# Patient Record
Sex: MALE | Race: WHITE | NOT HISPANIC OR LATINO | Employment: FULL TIME | URBAN - METROPOLITAN AREA
[De-identification: names, ages, dates, MRNs, and addresses within clinical notes are randomized per-mention and may not be internally consistent; named-entity substitution may affect disease eponyms.]

---

## 2017-07-06 ENCOUNTER — HOSPITAL ENCOUNTER (OUTPATIENT)
Dept: RADIOLOGY | Facility: MEDICAL CENTER | Age: 42
End: 2017-07-06
Attending: NEUROLOGICAL SURGERY
Payer: COMMERCIAL

## 2017-07-06 ENCOUNTER — HOSPITAL ENCOUNTER (OUTPATIENT)
Dept: RADIOLOGY | Facility: MEDICAL CENTER | Age: 42
End: 2017-07-06

## 2017-07-06 DIAGNOSIS — D49.6 BRAIN NEOPLASM (HCC): ICD-10-CM

## 2017-07-06 PROCEDURE — A9579 GAD-BASE MR CONTRAST NOS,1ML: HCPCS | Performed by: NEUROLOGICAL SURGERY

## 2017-07-06 PROCEDURE — 70553 MRI BRAIN STEM W/O & W/DYE: CPT

## 2017-07-06 PROCEDURE — 700117 HCHG RX CONTRAST REV CODE 255: Performed by: NEUROLOGICAL SURGERY

## 2017-07-06 RX ADMIN — GADODIAMIDE 20 ML: 287 INJECTION INTRAVENOUS at 09:05

## 2017-09-16 ENCOUNTER — HOSPITAL ENCOUNTER (OUTPATIENT)
Dept: LAB | Facility: MEDICAL CENTER | Age: 42
End: 2017-09-16
Payer: COMMERCIAL

## 2017-09-16 LAB
BDY FAT % MEASURED: 24.9 %
BP DIAS: 73 MMHG
BP SYS: 125 MMHG
CHOLEST SERPL-MCNC: 221 MG/DL (ref 100–199)
DIABETES HTDIA: NO
EVENT NAME HTEVT: NORMAL
FASTING HTFAS: YES
GLUCOSE SERPL-MCNC: 94 MG/DL (ref 65–99)
HDLC SERPL-MCNC: 49 MG/DL
HYPERTENSION HTHYP: NO
LDLC SERPL CALC-MCNC: 154 MG/DL
SCREENING LOC CITY HTCIT: NORMAL
SCREENING LOC STATE HTSTA: NORMAL
SCREENING LOCATION HTLOC: NORMAL
SMOKING HTSMO: NO
SUBSCRIBER ID HTSID: NORMAL
TRIGL SERPL-MCNC: 90 MG/DL (ref 0–149)

## 2017-09-16 PROCEDURE — 36415 COLL VENOUS BLD VENIPUNCTURE: CPT

## 2017-09-16 PROCEDURE — S5190 WELLNESS ASSESSMENT BY NONPH: HCPCS

## 2017-09-16 PROCEDURE — 82947 ASSAY GLUCOSE BLOOD QUANT: CPT

## 2017-09-16 PROCEDURE — 80061 LIPID PANEL: CPT

## 2017-09-26 ENCOUNTER — APPOINTMENT (OUTPATIENT)
Dept: SOCIAL WORK | Facility: CLINIC | Age: 42
End: 2017-09-26
Payer: COMMERCIAL

## 2017-09-26 PROCEDURE — 90471 IMMUNIZATION ADMIN: CPT | Performed by: REGISTERED NURSE

## 2017-09-26 PROCEDURE — 90686 IIV4 VACC NO PRSV 0.5 ML IM: CPT | Performed by: REGISTERED NURSE

## 2018-11-29 ENCOUNTER — APPOINTMENT (OUTPATIENT)
Dept: RADIOLOGY | Facility: MEDICAL CENTER | Age: 43
End: 2018-11-29
Attending: EMERGENCY MEDICINE
Payer: COMMERCIAL

## 2018-11-29 ENCOUNTER — HOSPITAL ENCOUNTER (OUTPATIENT)
Facility: MEDICAL CENTER | Age: 43
End: 2018-11-30
Attending: EMERGENCY MEDICINE | Admitting: SURGERY
Payer: COMMERCIAL

## 2018-11-29 DIAGNOSIS — K35.30 ACUTE APPENDICITIS WITH LOCALIZED PERITONITIS, WITHOUT PERFORATION, ABSCESS, OR GANGRENE: ICD-10-CM

## 2018-11-29 DIAGNOSIS — K35.80 ACUTE APPENDICITIS, UNSPECIFIED ACUTE APPENDICITIS TYPE: ICD-10-CM

## 2018-11-29 DIAGNOSIS — G89.18 ACUTE POST-OPERATIVE PAIN: ICD-10-CM

## 2018-11-29 LAB
ALBUMIN SERPL BCP-MCNC: 4.5 G/DL (ref 3.2–4.9)
ALBUMIN/GLOB SERPL: 1.6 G/DL
ALP SERPL-CCNC: 60 U/L (ref 30–99)
ALT SERPL-CCNC: 61 U/L (ref 2–50)
ANION GAP SERPL CALC-SCNC: 11 MMOL/L (ref 0–11.9)
APPEARANCE UR: CLEAR
AST SERPL-CCNC: 36 U/L (ref 12–45)
BASOPHILS # BLD AUTO: 0.3 % (ref 0–1.8)
BASOPHILS # BLD: 0.05 K/UL (ref 0–0.12)
BILIRUB SERPL-MCNC: 0.9 MG/DL (ref 0.1–1.5)
BILIRUB UR QL STRIP.AUTO: NEGATIVE
BUN SERPL-MCNC: 9 MG/DL (ref 8–22)
CALCIUM SERPL-MCNC: 9.5 MG/DL (ref 8.4–10.2)
CHLORIDE SERPL-SCNC: 99 MMOL/L (ref 96–112)
CO2 SERPL-SCNC: 24 MMOL/L (ref 20–33)
COLOR UR: YELLOW
CREAT SERPL-MCNC: 0.94 MG/DL (ref 0.5–1.4)
EOSINOPHIL # BLD AUTO: 0.03 K/UL (ref 0–0.51)
EOSINOPHIL NFR BLD: 0.2 % (ref 0–6.9)
ERYTHROCYTE [DISTWIDTH] IN BLOOD BY AUTOMATED COUNT: 37.2 FL (ref 35.9–50)
GLOBULIN SER CALC-MCNC: 2.8 G/DL (ref 1.9–3.5)
GLUCOSE SERPL-MCNC: 114 MG/DL (ref 65–99)
GLUCOSE UR STRIP.AUTO-MCNC: NEGATIVE MG/DL
HCT VFR BLD AUTO: 45.7 % (ref 42–52)
HGB BLD-MCNC: 16 G/DL (ref 14–18)
IMM GRANULOCYTES # BLD AUTO: 0.05 K/UL (ref 0–0.11)
IMM GRANULOCYTES NFR BLD AUTO: 0.3 % (ref 0–0.9)
KETONES UR STRIP.AUTO-MCNC: NEGATIVE MG/DL
LEUKOCYTE ESTERASE UR QL STRIP.AUTO: NEGATIVE
LIPASE SERPL-CCNC: 21 U/L (ref 7–58)
LYMPHOCYTES # BLD AUTO: 1.5 K/UL (ref 1–4.8)
LYMPHOCYTES NFR BLD: 9.5 % (ref 22–41)
MCH RBC QN AUTO: 31.5 PG (ref 27–33)
MCHC RBC AUTO-ENTMCNC: 35 G/DL (ref 33.7–35.3)
MCV RBC AUTO: 90 FL (ref 81.4–97.8)
MICRO URNS: NORMAL
MONOCYTES # BLD AUTO: 1.02 K/UL (ref 0–0.85)
MONOCYTES NFR BLD AUTO: 6.4 % (ref 0–13.4)
NEUTROPHILS # BLD AUTO: 13.21 K/UL (ref 1.82–7.42)
NEUTROPHILS NFR BLD: 83.3 % (ref 44–72)
NITRITE UR QL STRIP.AUTO: NEGATIVE
NRBC # BLD AUTO: 0 K/UL
NRBC BLD-RTO: 0 /100 WBC
PH UR STRIP.AUTO: 6 [PH]
PLATELET # BLD AUTO: 219 K/UL (ref 164–446)
PMV BLD AUTO: 9.1 FL (ref 9–12.9)
POTASSIUM SERPL-SCNC: 3.6 MMOL/L (ref 3.6–5.5)
PROT SERPL-MCNC: 7.3 G/DL (ref 6–8.2)
PROT UR QL STRIP: NEGATIVE MG/DL
RBC # BLD AUTO: 5.08 M/UL (ref 4.7–6.1)
RBC UR QL AUTO: NEGATIVE
SODIUM SERPL-SCNC: 134 MMOL/L (ref 135–145)
SP GR UR STRIP.AUTO: <=1.005
WBC # BLD AUTO: 15.9 K/UL (ref 4.8–10.8)

## 2018-11-29 PROCEDURE — 87040 BLOOD CULTURE FOR BACTERIA: CPT

## 2018-11-29 PROCEDURE — 80053 COMPREHEN METABOLIC PANEL: CPT

## 2018-11-29 PROCEDURE — 81003 URINALYSIS AUTO W/O SCOPE: CPT

## 2018-11-29 PROCEDURE — 74177 CT ABD & PELVIS W/CONTRAST: CPT

## 2018-11-29 PROCEDURE — 700105 HCHG RX REV CODE 258: Performed by: EMERGENCY MEDICINE

## 2018-11-29 PROCEDURE — 700117 HCHG RX CONTRAST REV CODE 255: Performed by: EMERGENCY MEDICINE

## 2018-11-29 PROCEDURE — 85025 COMPLETE CBC W/AUTO DIFF WBC: CPT

## 2018-11-29 PROCEDURE — 83690 ASSAY OF LIPASE: CPT

## 2018-11-29 PROCEDURE — 99285 EMERGENCY DEPT VISIT HI MDM: CPT

## 2018-11-29 RX ORDER — SODIUM CHLORIDE 9 MG/ML
1000 INJECTION, SOLUTION INTRAVENOUS ONCE
Status: COMPLETED | OUTPATIENT
Start: 2018-11-29 | End: 2018-11-30

## 2018-11-29 RX ADMIN — IOHEXOL 100 ML: 350 INJECTION, SOLUTION INTRAVENOUS at 23:18

## 2018-11-29 RX ADMIN — SODIUM CHLORIDE 1000 ML: 9 INJECTION, SOLUTION INTRAVENOUS at 22:48

## 2018-11-30 VITALS
RESPIRATION RATE: 16 BRPM | OXYGEN SATURATION: 92 % | TEMPERATURE: 98.2 F | HEART RATE: 88 BPM | BODY MASS INDEX: 27.23 KG/M2 | WEIGHT: 179.68 LBS | DIASTOLIC BLOOD PRESSURE: 77 MMHG | HEIGHT: 68 IN | SYSTOLIC BLOOD PRESSURE: 120 MMHG

## 2018-11-30 PROBLEM — K35.30 ACUTE APPENDICITIS WITH LOCALIZED PERITONITIS: Status: ACTIVE | Noted: 2018-11-30

## 2018-11-30 LAB — PATHOLOGY CONSULT NOTE: NORMAL

## 2018-11-30 PROCEDURE — G0378 HOSPITAL OBSERVATION PER HR: HCPCS

## 2018-11-30 PROCEDURE — 700101 HCHG RX REV CODE 250: Performed by: SURGERY

## 2018-11-30 PROCEDURE — 700101 HCHG RX REV CODE 250: Performed by: EMERGENCY MEDICINE

## 2018-11-30 PROCEDURE — 160048 HCHG OR STATISTICAL LEVEL 1-5: Performed by: SURGERY

## 2018-11-30 PROCEDURE — 88304 TISSUE EXAM BY PATHOLOGIST: CPT

## 2018-11-30 PROCEDURE — 501577 HCHG TROCAR, STEP 11MM: Performed by: SURGERY

## 2018-11-30 PROCEDURE — 501576 HCHG TROCAR, SMTH CAN&SEAL12: Performed by: SURGERY

## 2018-11-30 PROCEDURE — 700105 HCHG RX REV CODE 258: Performed by: SURGERY

## 2018-11-30 PROCEDURE — 160009 HCHG ANES TIME/MIN: Performed by: SURGERY

## 2018-11-30 PROCEDURE — 160046 HCHG PACU - 1ST 60 MINS PHASE II: Performed by: SURGERY

## 2018-11-30 PROCEDURE — 501583 HCHG TROCAR, THRD CAN&SEAL 5X100: Performed by: SURGERY

## 2018-11-30 PROCEDURE — 96367 TX/PROPH/DG ADDL SEQ IV INF: CPT

## 2018-11-30 PROCEDURE — 160028 HCHG SURGERY MINUTES - 1ST 30 MINS LEVEL 3: Performed by: SURGERY

## 2018-11-30 PROCEDURE — 700111 HCHG RX REV CODE 636 W/ 250 OVERRIDE (IP): Performed by: EMERGENCY MEDICINE

## 2018-11-30 PROCEDURE — 700111 HCHG RX REV CODE 636 W/ 250 OVERRIDE (IP): Performed by: SURGERY

## 2018-11-30 PROCEDURE — 501571 HCHG TROCAR, SEPARATOR 12X100: Performed by: SURGERY

## 2018-11-30 PROCEDURE — A6402 STERILE GAUZE <= 16 SQ IN: HCPCS | Performed by: SURGERY

## 2018-11-30 PROCEDURE — 500002 HCHG ADHESIVE, DERMABOND: Performed by: SURGERY

## 2018-11-30 PROCEDURE — 96361 HYDRATE IV INFUSION ADD-ON: CPT

## 2018-11-30 PROCEDURE — 700105 HCHG RX REV CODE 258: Performed by: EMERGENCY MEDICINE

## 2018-11-30 PROCEDURE — 501399 HCHG SPECIMAN BAG, ENDO CATC: Performed by: SURGERY

## 2018-11-30 PROCEDURE — 700111 HCHG RX REV CODE 636 W/ 250 OVERRIDE (IP)

## 2018-11-30 PROCEDURE — 160025 RECOVERY II MINUTES (STATS): Performed by: SURGERY

## 2018-11-30 PROCEDURE — 90471 IMMUNIZATION ADMIN: CPT

## 2018-11-30 PROCEDURE — 700101 HCHG RX REV CODE 250

## 2018-11-30 PROCEDURE — 160039 HCHG SURGERY MINUTES - EA ADDL 1 MIN LEVEL 3: Performed by: SURGERY

## 2018-11-30 PROCEDURE — 90686 IIV4 VACC NO PRSV 0.5 ML IM: CPT | Performed by: SURGERY

## 2018-11-30 PROCEDURE — 160035 HCHG PACU - 1ST 60 MINS PHASE I: Performed by: SURGERY

## 2018-11-30 PROCEDURE — 96365 THER/PROPH/DIAG IV INF INIT: CPT

## 2018-11-30 PROCEDURE — 160002 HCHG RECOVERY MINUTES (STAT): Performed by: SURGERY

## 2018-11-30 PROCEDURE — 502571 HCHG PACK, LAP CHOLE: Performed by: SURGERY

## 2018-11-30 PROCEDURE — 500800 HCHG LAPAROSCOPIC J/L HOOK: Performed by: SURGERY

## 2018-11-30 PROCEDURE — 501838 HCHG SUTURE GENERAL: Performed by: SURGERY

## 2018-11-30 PROCEDURE — 501570 HCHG TROCAR, SEPARATOR: Performed by: SURGERY

## 2018-11-30 PROCEDURE — 501450 HCHG STAPLES, ENDO MULTIFIRE: Performed by: SURGERY

## 2018-11-30 RX ORDER — SODIUM CHLORIDE, SODIUM LACTATE, POTASSIUM CHLORIDE, CALCIUM CHLORIDE 600; 310; 30; 20 MG/100ML; MG/100ML; MG/100ML; MG/100ML
INJECTION, SOLUTION INTRAVENOUS CONTINUOUS
Status: DISCONTINUED | OUTPATIENT
Start: 2018-11-30 | End: 2018-11-30 | Stop reason: HOSPADM

## 2018-11-30 RX ORDER — CETIRIZINE HYDROCHLORIDE 10 MG/1
10 TABLET ORAL DAILY
COMMUNITY

## 2018-11-30 RX ORDER — AMOXICILLIN AND CLAVULANATE POTASSIUM 875; 125 MG/1; MG/1
1 TABLET, FILM COATED ORAL 2 TIMES DAILY
Qty: 10 TAB | Refills: 0 | Status: SHIPPED | OUTPATIENT
Start: 2018-11-30

## 2018-11-30 RX ORDER — MORPHINE SULFATE 4 MG/ML
4 INJECTION, SOLUTION INTRAMUSCULAR; INTRAVENOUS EVERY 4 HOURS PRN
Status: DISCONTINUED | OUTPATIENT
Start: 2018-11-30 | End: 2018-11-30

## 2018-11-30 RX ORDER — HYDROMORPHONE HYDROCHLORIDE 1 MG/ML
0.1 INJECTION, SOLUTION INTRAMUSCULAR; INTRAVENOUS; SUBCUTANEOUS
Status: DISCONTINUED | OUTPATIENT
Start: 2018-11-30 | End: 2018-11-30 | Stop reason: HOSPADM

## 2018-11-30 RX ORDER — OXYCODONE HCL 5 MG/5 ML
10 SOLUTION, ORAL ORAL
Status: DISCONTINUED | OUTPATIENT
Start: 2018-11-30 | End: 2018-11-30 | Stop reason: HOSPADM

## 2018-11-30 RX ORDER — ONDANSETRON 2 MG/ML
4 INJECTION INTRAMUSCULAR; INTRAVENOUS EVERY 8 HOURS PRN
Status: DISCONTINUED | OUTPATIENT
Start: 2018-11-30 | End: 2018-11-30

## 2018-11-30 RX ORDER — BUPIVACAINE HYDROCHLORIDE AND EPINEPHRINE 2.5; 5 MG/ML; UG/ML
INJECTION, SOLUTION EPIDURAL; INFILTRATION; INTRACAUDAL; PERINEURAL
Status: DISCONTINUED | OUTPATIENT
Start: 2018-11-30 | End: 2018-11-30 | Stop reason: HOSPADM

## 2018-11-30 RX ORDER — DIPHENHYDRAMINE HYDROCHLORIDE 50 MG/ML
12.5 INJECTION INTRAMUSCULAR; INTRAVENOUS
Status: DISCONTINUED | OUTPATIENT
Start: 2018-11-30 | End: 2018-11-30 | Stop reason: HOSPADM

## 2018-11-30 RX ORDER — HYDROMORPHONE HYDROCHLORIDE 1 MG/ML
0.2 INJECTION, SOLUTION INTRAMUSCULAR; INTRAVENOUS; SUBCUTANEOUS
Status: DISCONTINUED | OUTPATIENT
Start: 2018-11-30 | End: 2018-11-30 | Stop reason: HOSPADM

## 2018-11-30 RX ORDER — OXYCODONE HYDROCHLORIDE 10 MG/1
10 TABLET ORAL
Status: DISCONTINUED | OUTPATIENT
Start: 2018-11-30 | End: 2018-11-30 | Stop reason: HOSPADM

## 2018-11-30 RX ORDER — OXYCODONE HYDROCHLORIDE 5 MG/1
5 TABLET ORAL EVERY 4 HOURS PRN
Qty: 18 TAB | Refills: 0 | Status: SHIPPED | OUTPATIENT
Start: 2018-11-30 | End: 2018-12-03

## 2018-11-30 RX ORDER — ONDANSETRON 2 MG/ML
4 INJECTION INTRAMUSCULAR; INTRAVENOUS EVERY 4 HOURS PRN
Status: DISCONTINUED | OUTPATIENT
Start: 2018-11-30 | End: 2018-11-30 | Stop reason: HOSPADM

## 2018-11-30 RX ORDER — OXYCODONE HCL 5 MG/5 ML
5 SOLUTION, ORAL ORAL
Status: DISCONTINUED | OUTPATIENT
Start: 2018-11-30 | End: 2018-11-30 | Stop reason: HOSPADM

## 2018-11-30 RX ORDER — SODIUM CHLORIDE, SODIUM LACTATE, POTASSIUM CHLORIDE, CALCIUM CHLORIDE 600; 310; 30; 20 MG/100ML; MG/100ML; MG/100ML; MG/100ML
INJECTION, SOLUTION INTRAVENOUS ONCE
Status: DISCONTINUED | OUTPATIENT
Start: 2018-11-30 | End: 2018-11-30 | Stop reason: HOSPADM

## 2018-11-30 RX ORDER — BISMUTH SUBSALICYLATE 262 MG/1
262 TABLET, CHEWABLE ORAL
COMMUNITY

## 2018-11-30 RX ORDER — MEPERIDINE HYDROCHLORIDE 25 MG/ML
6.25 INJECTION INTRAMUSCULAR; INTRAVENOUS; SUBCUTANEOUS
Status: DISCONTINUED | OUTPATIENT
Start: 2018-11-30 | End: 2018-11-30 | Stop reason: HOSPADM

## 2018-11-30 RX ORDER — ONDANSETRON 2 MG/ML
4 INJECTION INTRAMUSCULAR; INTRAVENOUS
Status: DISCONTINUED | OUTPATIENT
Start: 2018-11-30 | End: 2018-11-30 | Stop reason: HOSPADM

## 2018-11-30 RX ORDER — ONDANSETRON 4 MG/1
4 TABLET, FILM COATED ORAL EVERY 4 HOURS PRN
Qty: 18 TAB | Refills: 1 | Status: SHIPPED | OUTPATIENT
Start: 2018-11-30 | End: 2018-12-03

## 2018-11-30 RX ORDER — OXYCODONE HYDROCHLORIDE 5 MG/1
5 TABLET ORAL
Status: DISCONTINUED | OUTPATIENT
Start: 2018-11-30 | End: 2018-11-30 | Stop reason: HOSPADM

## 2018-11-30 RX ORDER — MORPHINE SULFATE 4 MG/ML
2-4 INJECTION, SOLUTION INTRAMUSCULAR; INTRAVENOUS
Status: DISCONTINUED | OUTPATIENT
Start: 2018-11-30 | End: 2018-11-30 | Stop reason: HOSPADM

## 2018-11-30 RX ORDER — SODIUM CHLORIDE, SODIUM LACTATE, POTASSIUM CHLORIDE, CALCIUM CHLORIDE 600; 310; 30; 20 MG/100ML; MG/100ML; MG/100ML; MG/100ML
INJECTION, SOLUTION INTRAVENOUS ONCE
Status: COMPLETED | OUTPATIENT
Start: 2018-11-30 | End: 2018-11-30

## 2018-11-30 RX ORDER — HYDROMORPHONE HYDROCHLORIDE 1 MG/ML
0.4 INJECTION, SOLUTION INTRAMUSCULAR; INTRAVENOUS; SUBCUTANEOUS
Status: DISCONTINUED | OUTPATIENT
Start: 2018-11-30 | End: 2018-11-30 | Stop reason: HOSPADM

## 2018-11-30 RX ORDER — SODIUM CHLORIDE 9 MG/ML
INJECTION, SOLUTION INTRAVENOUS CONTINUOUS
Status: DISCONTINUED | OUTPATIENT
Start: 2018-11-30 | End: 2018-11-30 | Stop reason: HOSPADM

## 2018-11-30 RX ORDER — HALOPERIDOL 5 MG/ML
1 INJECTION INTRAMUSCULAR
Status: DISCONTINUED | OUTPATIENT
Start: 2018-11-30 | End: 2018-11-30 | Stop reason: HOSPADM

## 2018-11-30 RX ADMIN — CEFTRIAXONE SODIUM 2 G: 2 INJECTION, POWDER, FOR SOLUTION INTRAMUSCULAR; INTRAVENOUS at 00:11

## 2018-11-30 RX ADMIN — METRONIDAZOLE 500 MG: 500 INJECTION, SOLUTION INTRAVENOUS at 00:42

## 2018-11-30 RX ADMIN — CEFOTETAN DISODIUM 2 G: 2 INJECTION, POWDER, FOR SOLUTION INTRAMUSCULAR; INTRAVENOUS at 07:44

## 2018-11-30 RX ADMIN — SODIUM CHLORIDE: 9 INJECTION, SOLUTION INTRAVENOUS at 01:39

## 2018-11-30 RX ADMIN — INFLUENZA A VIRUS A/MICHIGAN/45/2015 X-275 (H1N1) ANTIGEN (FORMALDEHYDE INACTIVATED), INFLUENZA A VIRUS A/SINGAPORE/INFIMH-16-0019/2016 IVR-186 (H3N2) ANTIGEN (FORMALDEHYDE INACTIVATED), INFLUENZA B VIRUS B/PHUKET/3073/2013 ANTIGEN (FORMALDEHYDE INACTIVATED), AND INFLUENZA B VIRUS B/MARYLAND/15/2016 BX-69A ANTIGEN (FORMALDEHYDE INACTIVATED) 0.5 ML: 15; 15; 15; 15 INJECTION, SUSPENSION INTRAMUSCULAR at 05:25

## 2018-11-30 RX ADMIN — SODIUM CHLORIDE, SODIUM LACTATE, POTASSIUM CHLORIDE, CALCIUM CHLORIDE: 600; 310; 30; 20 INJECTION, SOLUTION INTRAVENOUS at 10:57

## 2018-11-30 ASSESSMENT — COGNITIVE AND FUNCTIONAL STATUS - GENERAL
SUGGESTED CMS G CODE MODIFIER DAILY ACTIVITY: CH
MOBILITY SCORE: 24
DAILY ACTIVITIY SCORE: 24
SUGGESTED CMS G CODE MODIFIER MOBILITY: CH

## 2018-11-30 ASSESSMENT — PAIN SCALES - GENERAL
PAINLEVEL_OUTOF10: 0

## 2018-11-30 ASSESSMENT — LIFESTYLE VARIABLES
ALCOHOL_USE: NO
EVER_SMOKED: YES

## 2018-11-30 ASSESSMENT — PATIENT HEALTH QUESTIONNAIRE - PHQ9
2. FEELING DOWN, DEPRESSED, IRRITABLE, OR HOPELESS: NOT AT ALL
1. LITTLE INTEREST OR PLEASURE IN DOING THINGS: NOT AT ALL
SUM OF ALL RESPONSES TO PHQ9 QUESTIONS 1 AND 2: 0

## 2018-11-30 NOTE — OR NURSING
1225- Patient admitted to PACU from the operating room. No distress noted at this time. Oral airway in place. Respirations noted at 12. 3 laparascopic stab wounds to abdomen noted as clean, dry and intact.     1235- Oral airway discontinued.     1240- Patient denies any pain or nausea currently. Patient given liquids and able to tolerate without difficulty.     1250- Patient resting in bed. Incentive spirometer given and patient given correct return demonstration.     1300-Patient awake, denies any pain. Patient ready for transport to stage II once room available.     1312- Patient transferred to stage II area after assistance with dressing from nursing staff.

## 2018-11-30 NOTE — H&P
Surgery General History & Physical Note    Date  11/30/2018    Primary Care Physician  Lakshmi Ayala M.D.    CC  RLQ pain    Requesting physician: Dr. Nolan    [facility]  This is a 43 y.o. male who presented with generalized abdominal pain localizing to the right lower quadrant that started after lunch yesterday. He has had fevers and sweats, as well as anorexia and nausea. He has not vomited. He hasn't had any recent illnesses and felt well prior to this.     History reviewed. No pertinent past medical history.    History reviewed. No pertinent surgical history.    Current Facility-Administered Medications   Medication Dose Route Frequency Provider Last Rate Last Dose   • NS infusion   Intravenous Continuous Irene Nolan D.O. 125 mL/hr at 11/30/18 0139     • morphine (pf) 4 mg/ml injection 4 mg  4 mg Intravenous Q4HRS PRN MADELAINE Cohen.MAURICE.       • ondansetron (ZOFRAN) syringe/vial injection 4 mg  4 mg Intravenous Q8HRS PRN MADELAINE Cohen.O.           Social History     Social History   • Marital status:      Spouse name: N/A   • Number of children: N/A   • Years of education: N/A     Occupational History   • Not on file.     Social History Main Topics   • Smoking status: Never Smoker   • Smokeless tobacco: Never Used   • Alcohol use Yes   • Drug use: No   • Sexual activity: Not on file     Other Topics Concern   • Not on file     Social History Narrative   • No narrative on file       History reviewed. No pertinent family history.    Allergies  Patient has no known allergies.    Review of Systems  Negativeper 12 systems except as described in the HPI    Physical Exam   Constitutional: He is oriented to person, place, and time. He appears well-developed and well-nourished. No distress.   HENT:   Head: Normocephalic and atraumatic.   Eyes: Conjunctivae are normal.   Neck: Normal range of motion. Neck supple. No tracheal deviation present. No thyromegaly present.   Cardiovascular: Normal rate, regular rhythm and  intact distal pulses.    Pulmonary/Chest: Effort normal and breath sounds normal. No respiratory distress.   Abdominal: Soft. He exhibits no distension. There is tenderness. There is rebound and guarding.   Right lower quadrant rebound and guarding, +Rovsing's sign   Musculoskeletal: Normal range of motion. He exhibits no edema or deformity.   Neurological: He is alert and oriented to person, place, and time.   Skin: Skin is warm and dry. No rash noted. He is not diaphoretic. No erythema.       Vital Signs  Blood Pressure: 106/71   Temperature: 36.8 °C (98.3 °F)   Pulse: 71   Respiration: 15   Pulse Oximetry: 93 %       Labs:  Recent Labs      11/29/18   2100   WBC  15.9*   RBC  5.08   HEMOGLOBIN  16.0   HEMATOCRIT  45.7   MCV  90.0   MCH  31.5   MCHC  35.0   RDW  37.2   PLATELETCT  219   MPV  9.1     Recent Labs      11/29/18   2100   SODIUM  134*   POTASSIUM  3.6   CHLORIDE  99   CO2  24   GLUCOSE  114*   BUN  9   CREATININE  0.94   CALCIUM  9.5         Recent Labs      11/29/18   2100   ASTSGOT  36   ALTSGPT  61*   TBILIRUBIN  0.9   ALKPHOSPHAT  60   GLOBULIN  2.8       Radiology:  CT-ABDOMEN-PELVIS WITH   Final Result         1.  Findings compatible with acute appendicitis.      These findings were discussed with the patient's clinician, JUVENTINO GARRETT, on 11/29/2018 11:24 PM.        On my review of the images, the appendix is lateral to the cecum, enlarged with surrounding fat stranding but no free fluid or air to indicated perforation.     Assessment/Plan:  Acute appendicitis with localized peritonitis.   Will proceed to surgery (laparosocpic appendectomy) this morning. We discussed the surgery itself with risks including, but not limited to, bleeding, infection, damage to small or large intestine, needing an open procedure, needing a drain placement, prolonged hospital stay. We also discussed the typical post operative course. All of his questions were answered to his apparent satisfaction and his agreed to  proceed. We will proceed with surgery today.

## 2018-11-30 NOTE — ED PROVIDER NOTES
"ED Provider Note  CHIEF COMPLAINT  Abdominal pain and nausea    [facility]  Kang Lundy is a 43 y.o. male who presents to the emergency department for evaluation of abdominal pain and nausea. The patient states that he started having abdominal pain suddenly after lunch. He states that the pain is located in the RLQ. He describes th pain as cramping. It has been constant but has been waxing and waning. He admits to nausea but has not vomited. He has not had diarrhea but does admit to subjective fevers. He denies any dysuria or hematuria. He denies any testicular pain. He denies any previous abdominal surgeries. He denies any recent travel or suspicious food intake.     REVIEW OF SYSTEMS  See HPI for further details. All other systems are negative.     PAST MEDICAL HISTORY       SOCIAL HISTORY  Social History     Social History Main Topics   • Smoking status: Never Smoker   • Smokeless tobacco: Never Used   • Alcohol use Yes   • Drug use: No   • Sexual activity: Not on file       SURGICAL HISTORY  patient denies any surgical history    CURRENT MEDICATIONS  Home Medications     Reviewed by Mio Garcia R.N. (Registered Nurse) on 11/29/18 at 2056  Med List Status: Partial   Medication Last Dose Status        Patient Isaias Taking any Medications                       ALLERGIES  No Known Allergies    PHYSICAL EXAM  VITAL SIGNS: /92   Pulse 78   Temp 37.3 °C (99.2 °F) (Temporal)   Resp 18   Ht 1.727 m (5' 8\")   Wt 81.5 kg (179 lb 10.8 oz)   SpO2 93%   BMI 27.32 kg/m²    Constitutional: Alert and in no apparent distress.  HENT: Normocephalic atraumatic. Bilateral external ears normal. Nose normal. Mucous membranes are moist.  Eyes: Pupils are equal and reactive. Conjunctiva normal. Non-icteric sclera.   Neck: Normal range of motion without tenderness. Supple. No meningeal signs.  Cardiovascular: Regular rate and rhythm. No murmurs, gallops or rubs.  Thorax & Lungs: Breath sounds are clear to auscultation " bilaterally. No wheezing, rhonchi or rales.  Abdomen: Soft and non-distended.  The patient does have tenderness to palpation and involuntary guarding in the right lower quadrant.  No rebound or rigidity is noted.    Skin: Warm and dry. No rashes are noted.  Back: No bony tenderness, No CVA tenderness.   Extremities: 2+ peripheral pulses. Cap refill is less than 2 seconds. No edema, cyanosis, or clubbing.  Musculoskeletal: Good range of motion in all major joints. No tenderness to palpation or major deformities noted.   Neurologic: Alert and oriented ×3. The patient moves all 4 extremities and follows commands.  Psychiatric: Affect is normal. Judgment appears to be intact.      DIAGNOSTIC STUDIES / PROCEDURES    LABS  Results for orders placed or performed during the hospital encounter of 11/29/18   CBC WITH DIFFERENTIAL   Result Value Ref Range    WBC 15.9 (H) 4.8 - 10.8 K/uL    RBC 5.08 4.70 - 6.10 M/uL    Hemoglobin 16.0 14.0 - 18.0 g/dL    Hematocrit 45.7 42.0 - 52.0 %    MCV 90.0 81.4 - 97.8 fL    MCH 31.5 27.0 - 33.0 pg    MCHC 35.0 33.7 - 35.3 g/dL    RDW 37.2 35.9 - 50.0 fL    Platelet Count 219 164 - 446 K/uL    MPV 9.1 9.0 - 12.9 fL    Neutrophils-Polys 83.30 (H) 44.00 - 72.00 %    Lymphocytes 9.50 (L) 22.00 - 41.00 %    Monocytes 6.40 0.00 - 13.40 %    Eosinophils 0.20 0.00 - 6.90 %    Basophils 0.30 0.00 - 1.80 %    Immature Granulocytes 0.30 0.00 - 0.90 %    Nucleated RBC 0.00 /100 WBC    Neutrophils (Absolute) 13.21 (H) 1.82 - 7.42 K/uL    Lymphs (Absolute) 1.50 1.00 - 4.80 K/uL    Monos (Absolute) 1.02 (H) 0.00 - 0.85 K/uL    Eos (Absolute) 0.03 0.00 - 0.51 K/uL    Baso (Absolute) 0.05 0.00 - 0.12 K/uL    Immature Granulocytes (abs) 0.05 0.00 - 0.11 K/uL    NRBC (Absolute) 0.00 K/uL   COMP METABOLIC PANEL   Result Value Ref Range    Sodium 134 (L) 135 - 145 mmol/L    Potassium 3.6 3.6 - 5.5 mmol/L    Chloride 99 96 - 112 mmol/L    Co2 24 20 - 33 mmol/L    Anion Gap 11.0 0.0 - 11.9    Glucose 114 (H) 65  - 99 mg/dL    Bun 9 8 - 22 mg/dL    Creatinine 0.94 0.50 - 1.40 mg/dL    Calcium 9.5 8.4 - 10.2 mg/dL    AST(SGOT) 36 12 - 45 U/L    ALT(SGPT) 61 (H) 2 - 50 U/L    Alkaline Phosphatase 60 30 - 99 U/L    Total Bilirubin 0.9 0.1 - 1.5 mg/dL    Albumin 4.5 3.2 - 4.9 g/dL    Total Protein 7.3 6.0 - 8.2 g/dL    Globulin 2.8 1.9 - 3.5 g/dL    A-G Ratio 1.6 g/dL   LIPASE   Result Value Ref Range    Lipase 21 7 - 58 U/L   ESTIMATED GFR   Result Value Ref Range    GFR If African American >60 >60 mL/min/1.73 m 2    GFR If Non African American >60 >60 mL/min/1.73 m 2         RADIOLOGY  CT-ABDOMEN-PELVIS WITH   Final Result         1.  Findings compatible with acute appendicitis.      These findings were discussed with the patient's clinician, JUVENTINO GARRETT, on 11/29/2018 11:24 PM.            COURSE & MEDICAL DECISION MAKING  Pertinent Labs & Imaging studies reviewed. (See chart for details)    This is a 43-year-old male presenting to the emergency department for the evaluation of abdominal pain and nausea.  On initial evaluation, the patient did not appear to be in acute distress and his vital signs were normal.  He was noted to have tenderness to palpation in the right lower quadrant with some involuntary guarding however he did not have any rigidity or rebound tenderness.  He was noted to have a leukocytosis of 15.9 but was afebrile and did not have any other evidence of sepsis.  Given his history and physical exam, I was concerned for acute appendicitis.  A CT of the abdomen and pelvis with contrast was ordered and consistent with acute appendicitis.    11:30 PM - I discussed the case with Dr Shea who requested that I place holding orders and she will see the patient in the morning. She agreed with the plan for ceftriaxone and flagyl at this time.     Blood cultures were obtained and the patient was treated with IV ceftriaxone and Flagyl.  He was also made n.p.o. and given IV fluids for his n.p.o. status.  He continued  to remain stable without evidence of sepsis or peritonitis while in the emergency department and was transferred to the floor for surgical intervention in the morning.    FINAL IMPRESSION  1. Acute appendicitis, unspecified acute appendicitis type          -ADMIT-           Electronically signed by: Irene Nolan, 11/29/2018 10:21 PM

## 2018-11-30 NOTE — OR NURSING
1038: Pt brought to pre-op holding area via bed by transport with wife at bedside, tolerated it well. Pain is tolerable, no nausea, awake and alert, on room air.  1058: Patient allergies and NPO status verified, home medication reconciliation completed and belongings secured. Patient verbalizes understanding of pain scale, expected course of stay and plan of care. Surgical site verified with patient. IV access established. Sequentials placed on legs.

## 2018-11-30 NOTE — OR NURSING
1355- Discharge instructions discussed with both patient and family with both stating that instructions understood. Patient discharged to awaiting vehicle via wheelchair.

## 2018-11-30 NOTE — CARE PLAN
Problem: Safety  Goal: Will remain free from injury  Fall precautions remain in place: treaded socks on pt, appropriate signs on doorway, IV pole on side of bathroom, lowest bed rails down, bed in lowest position and locked, call light and phone within reach.    Problem: Knowledge Deficit  Goal: Knowledge of disease process/condition, treatment plan, diagnostic tests, and medications will improve  Plan for surgery today,. Pt educated on POC. Pt educated on remaining NPO. Pt demonstrates understanding.

## 2018-11-30 NOTE — PROGRESS NOTES
Pt agreed to take flu shot. Per charge RN and hospital protocol, it recommends to give pt flu shot. Influenza vaccine and instructions given. Pt has no further questions at this moment.

## 2018-11-30 NOTE — PROGRESS NOTES
Acute pain   This is a new problem.   Patient is complaining of pain in his surgical incision sites  Pain is constant, described as sharp and a 5/10 on the pain scale.   Treatments tried include:Tylenol, NSAIDs, heat, ice, rest, movement     Is the pain medication improving the patient’s symptoms: Yes  Any adverse effects: No  Alcohol or illicit drug use:   He  reports that he drinks alcohol.  He  reports that he does not use drugs.     History of controlled substance used in a way other than prescribed? No     Any early refills of a controlled substance: No  History of lost or stolen controlled substance prescription: No  Any aberrant behavior or intoxication while on a controlled substance: No  Has the patient self-modified their dose or frequency of the medication :No  Compliant with treatment recommendations and plan: Yes  Any major health change to the patient: Yes Surgery today  Concerns for misuse, abuse or addiction: No  /NarxCheck report reviewed: Yes  History of abnormal drug screening: No  I have assessed the patient’s risk for abuse, dependency, and addiction using the validated Opioid Risk Tool.     Opioid Risk Score: 1  low risk     Interpretation of Opioid Risk Score   Score 0-3 = Low risk of abuse. Do UDS at least once per year.  Score 4-7 = Moderate risk of abuse. Do UDS 1-4 times per year.  Score 8+ = High risk of abuse. Refer to specialist.     I have conducted a physical exam and documented findings.     I certify that I have obtained and reviewed his medical history. I have also made a good evlein effort to obtain applicable records from other providers who have treated the patient.  I have reviewed the patient's prescription history as maintained by the [place] Prescription Monitoring Program.      Given the above, I believe the benefits of controlled substance therapy outweigh the risks. The reasons for prescribing controlled substances include non-narcotic, oral analgesic alternatives have  been inadequate for pain control. Accordingly, I have discussed the risk and benefits, treatment plan, and alternative therapies with the patient. Patient was advised that this medicine is intended for short term (no more than 14 days)/intermittent use only and not intended to be an ongoing prescription.

## 2018-11-30 NOTE — PROGRESS NOTES
Med rec complete per pt at bedside  Allergies have been verified   No ABX within the last 30 days

## 2018-11-30 NOTE — DISCHARGE INSTRUCTIONS
ACTIVITY: Rest and take it easy for the first 24 hours.  A responsible adult is recommended to remain with you during that time.  It is normal to feel sleepy.  We encourage you to not do anything that requires balance, judgment or coordination.    MILD FLU-LIKE SYMPTOMS ARE NORMAL. YOU MAY EXPERIENCE GENERALIZED MUSCLE ACHES, THROAT IRRITATION, HEADACHE AND/OR SOME NAUSEA.    FOR 24 HOURS DO NOT:  Drive, operate machinery or run household appliances.  Drink beer or alcoholic beverages.   Make important decisions or sign legal documents.    SPECIAL INSTRUCTIONS: Laparoscopic Appendectomy D/C instructions:     1. DIET: Upon discharge from the hospital you may resume your normal pre-operative diet. Depending on how you are feeling and whether you have nausea or not, you may wish to stay with a bland diet for the first few days. However, you can advance this as quickly as you feel ready.     2. ACTIVITIES: After discharge from the hospital, you may resume full routine activities. However, there should be no heavy lifting (greater than 15 pounds) and no strenuous activities until after your follow-up visit. Otherwise, routine activities of daily living are acceptable.     3. DRIVING: You may drive whenever you are off pain medications and are able to perform the activities needed to drive, i.e. turning, bending, twisting, etc.     4. BATHING: You may get the wound wet at any time after leaving the hospital. You may shower, but do not submerge in a bath for at least a week. Dressings are a surgical superglue and will start to peel off after 7-10 days.     5. BOWEL FUNCTION: A few patients, after this operation, will develop either frequent or loose stools after meals. This usually corrects itself after a few days, to a few weeks. If this occurs, do not worry; it is not unusual and will resolve. Much more common than loose stools, is constipation. The combination of pain medication and decreased activity level can cause  constipation in otherwise normal patients. If you feel this is occurring, take a laxative (Milk of Magnesia, Ex-Lax, Senokot, etc.) until the problem has resolved.     6. PAIN MEDICATION: You will be given a prescription for pain medication at discharge. Please take these as directed. It is important to remember not to take medications on an empty stomach as this may cause nausea.     7. CALL IF YOU HAVE: (1) Fevers to more than 1010 F, (2) Unusual chest or leg pain, (3) Drainage or fluid from incision that may be foul smelling, increased tenderness or soreness at the wound or the wound edges are no longer together, redness or swelling at the incision site. Please do not hesitate to call with any other questions.     8. APPOINTMENT: Contact our office at [phone] for a follow-up appointment in 1 to 2 weeks following your procedure.     If you have any additional questions, please do not hesitate to call the office and speak to either myself or the physician on call.       DIET: To avoid nausea, slowly advance diet as tolerated, avoiding spicy or greasy foods for the first day.  Add more substantial food to your diet according to your physician's instructions.  Babies can be fed formula or breast milk as soon as they are hungry.  INCREASE FLUIDS AND FIBER TO AVOID CONSTIPATION.    SURGICAL DRESSING/BATHING: see above    FOLLOW-UP APPOINTMENT:  A follow-up appointment should be arranged with your doctor in 1-2 weeks; call to schedule.    You should CALL YOUR PHYSICIAN if you develop:  Fever greater than 101 degrees F.  Pain not relieved by medication, or persistent nausea or vomiting.  Excessive bleeding (blood soaking through dressing) or unexpected drainage from the wound.  Extreme redness or swelling around the incision site, drainage of pus or foul smelling drainage.  Inability to urinate or empty your bladder within 8 hours.  Problems with breathing or chest pain.    You should call 911 if you develop  problems with breathing or chest pain.  If you are unable to contact your doctor or surgical center, you should go to the nearest emergency room or urgent care center.  Physician's telephone #: Mercedes Jimenez MD   [facility]   [phone]      If any questions arise, call your doctor.  If your doctor is not available, please feel free to call the Surgical Center at [phone].  The Center is open Monday through Friday from 7AM to 7PM.  You can also call the HEALTH HOTLINE open 24 hours/day, 7 days/week and speak to a nurse at [phone], or toll free at [phone].    A registered nurse may call you a few days after your surgery to see how you are doing after your procedure.    MEDICATIONS: Resume taking daily medication.  Take prescribed pain medication with food.  If no medication is prescribed, you may take non-aspirin pain medication if needed.  PAIN MEDICATION CAN BE VERY CONSTIPATING.  Take a stool softener or laxative such as senokot, pericolace, or milk of magnesia if needed.        If your physician has prescribed pain medication that includes Acetaminophen (Tylenol), do not take additional Acetaminophen (Tylenol) while taking the prescribed medication.    Depression / Suicide Risk    As you are discharged from this [facility] facility, it is important to learn how to keep safe from harming yourself.    Recognize the warning signs:  · Abrupt changes in personality, positive or negative- including increase in energy   · Giving away possessions  · Change in eating patterns- significant weight changes-  positive or negative  · Change in sleeping patterns- unable to sleep or sleeping all the time   · Unwillingness or inability to communicate  · Depression  · Unusual sadness, discouragement and loneliness  · Talk of wanting to die  · Neglect of personal appearance   · Rebelliousness- reckless behavior  · Withdrawal from people/activities they love  · Confusion- inability to  concentrate     If you or a loved one observes any of these behaviors or has concerns about self-harm, here's what you can do:  · Talk about it- your feelings and reasons for harming yourself  · Remove any means that you might use to hurt yourself (examples: pills, rope, extension cords, firearm)  · Get professional help from the community (Mental Health, Substance Abuse, psychological counseling)  · Do not be alone:Call your Safe Contact- someone whom you trust who will be there for you.  · Call your local CRISIS HOTLINE 412-4599 or 418-141-5719  · Call your local Children's Mobile Crisis Response Team [place] (949) 297-8511 or www.Orad  · Call the toll free National Suicide Prevention Hotlines   · National Suicide Prevention Lifeline 275-414-PKJW (8336)  · National Hope Line Network 800-SUICIDE (193-2591)

## 2018-11-30 NOTE — PROGRESS NOTES
Pt awake, alert and oriented. Pt denies abdominal pain at this time, denies nausea. abdomen tender and semifirm. No signs of distress. POC discussed, pt remains NPO. Plan for surgery this AM.

## 2018-11-30 NOTE — PROGRESS NOTES
Pt arrived via gurney, admitted to room 224-2 from ER.. Pt is A&Ox4, pt stated LLQ pain is tolerable at this moment - no pain meds needed. Informed pt about surgery in AM, remains stricti NPO for now. IVF-NS at 125mL/her. Oriented to room call light and smoking policy.  Fall precaution in place. Call light within reach. Encouraged pt the importance to call for assistance. Continue to monitor.

## 2018-11-30 NOTE — OP REPORT
DATE OF OPERATION: 11/30/2018    PREOPERATIVE DIAGNOSIS: Acute appendicitis with localized peritonitis.   POSTOPERATIVE DIAGNOSIS: Acute appendicitis with localized peritonitis.     PROCEDURE PERFORMED: Laparoscopic appendectomy.     SURGEON: Mercedes Jimenez MD.     ANESTHESIOLOGIST: Dr. Autumn LUNA    SPECIMENS: Appendix.     ESTIMATED BLOOD LOSS: 5mL     FINDINGS: Inflamed appendix with a tiny amount of murky fluid in the appendiceal fossa. No gangrene or purulent fluid, no evidence of perforation or abscess.     INDICATIONS: Mr. Lundy is a healthy 43 year old man who presented with increasing abdominal pain and was found to have a leukocytosis and a CT revealed acute uncomplicated appendicitis.  I discussed definitive surgical therapy with the patient   with risks, benefits and alternatives including, but not limited to, bleeding, infection,   damage to surrounding structures, possible need for further procedures. The patient   understood and agreed to proceed. All of their questions were answered to their satisfaction.     DESCRIPTION OF PROCEDURE: The patient was brought to the operating room. The patient was then   placed in a comfortable supine position on the operating room table with   all appropriate points padded. General anesthesia was induced without   complications. The patient's abdomen was clipped, prepped, and draped in the   usual sterile fashion. A timeout was held and completed confirming correct   patient, correct site, correct procedure and SCDs on and functioning. The   received antibiotics prior to incision to protocol after infiltration of 0.25%   Marcaine with epinephrine. A 1 cm incision was made supraumbilically. This   was taken down bluntly to fascia using a hemostat. A penetrating towel clip   was used to grasp the umbilical stalk and elevate the abdominal wall. A   Veress needle was placed into the peritoneal cavity. A saline drop test   showed no evidence of injury to bowel or  vessel. The peritoneum was   insufflated to pressure of 15 mmHg with CO2. A 12-mm separator trocar was   placed through this incision and a camera was introduced, confirming no   evidence of injury to bowel or vessel. An additional 5 mm trocar was placed   suprapubically and one in the left lower quadrant after infiltration of 0.5%   Marcaine with epinephrine. The patient was placed in a reverse Trendelenburg   right side up position and the cecum was located. The appendix was dilated and inflamed, but there was no gangrene or evidence of perforation. There was a tiny amount of murky fluid in the appendiceal fossa. I freed up embryologic adhesions to mobilize the appendix with a bovie cautery. I made a window in the mesentery at the base of the appendix. A 45 mm   vascular stapler load was fired across the base of the appendix, and an additional load   was fired across the appendiceal mesentery. The specimen was placed in an   EndoCatch bag and removed through the umbilical port. I then replaced the camera,  confirmed excellent hemostasis at the staple lines, and irrigated the area judiciously.   I then let the insufflation out of the abdomen. I then closed the umbilical incision using a   single 0 Vicryl in a figure of 8 fashion, and closed the skin on all 3 incisions after   irrigation with saline with a running 4-0 subcuticular Vicryl. These were   dressed with dry dressings. The patient was awoken from anesthesia and   transferred to the postanesthesia care unit in good condition having tolerated   the procedure well.

## 2018-12-05 LAB
BACTERIA BLD CULT: NORMAL
BACTERIA BLD CULT: NORMAL
SIGNIFICANT IND 70042: NORMAL
SIGNIFICANT IND 70042: NORMAL
SITE SITE: NORMAL
SITE SITE: NORMAL
SOURCE SOURCE: NORMAL
SOURCE SOURCE: NORMAL

## 2019-07-08 ENCOUNTER — HOSPITAL ENCOUNTER (OUTPATIENT)
Dept: RADIOLOGY | Facility: MEDICAL CENTER | Age: 44
End: 2019-07-08
Attending: NEUROLOGICAL SURGERY
Payer: COMMERCIAL

## 2019-07-08 DIAGNOSIS — C41.9 MALIGNANT NEOPLASM OF BONE, UNSPECIFIED LOCATION (HCC): ICD-10-CM

## 2019-07-08 PROCEDURE — A9585 GADOBUTROL INJECTION: HCPCS | Performed by: NEUROLOGICAL SURGERY

## 2019-07-08 PROCEDURE — 70553 MRI BRAIN STEM W/O & W/DYE: CPT

## 2019-07-08 PROCEDURE — 700117 HCHG RX CONTRAST REV CODE 255: Performed by: NEUROLOGICAL SURGERY

## 2019-07-08 RX ORDER — GADOBUTROL 604.72 MG/ML
7.5 INJECTION INTRAVENOUS ONCE
Status: COMPLETED | OUTPATIENT
Start: 2019-07-08 | End: 2019-07-08

## 2019-07-08 RX ADMIN — GADOBUTROL 7.5 ML: 604.72 INJECTION INTRAVENOUS at 09:30

## 2023-12-30 ENCOUNTER — OFFICE VISIT (OUTPATIENT)
Dept: URGENT CARE | Facility: CLINIC | Age: 48
End: 2023-12-30
Payer: COMMERCIAL

## 2023-12-30 VITALS
OXYGEN SATURATION: 96 % | HEART RATE: 82 BPM | TEMPERATURE: 97.3 F | SYSTOLIC BLOOD PRESSURE: 138 MMHG | RESPIRATION RATE: 14 BRPM | WEIGHT: 194 LBS | HEIGHT: 68 IN | BODY MASS INDEX: 29.4 KG/M2 | DIASTOLIC BLOOD PRESSURE: 82 MMHG

## 2023-12-30 DIAGNOSIS — L08.9 SKIN INFECTION: ICD-10-CM

## 2023-12-30 DIAGNOSIS — L30.9 DERMATITIS: ICD-10-CM

## 2023-12-30 PROCEDURE — 3079F DIAST BP 80-89 MM HG: CPT | Performed by: NURSE PRACTITIONER

## 2023-12-30 PROCEDURE — 99203 OFFICE O/P NEW LOW 30 MIN: CPT | Performed by: NURSE PRACTITIONER

## 2023-12-30 PROCEDURE — 3075F SYST BP GE 130 - 139MM HG: CPT | Performed by: NURSE PRACTITIONER

## 2023-12-30 RX ORDER — SULFAMETHOXAZOLE AND TRIMETHOPRIM 800; 160 MG/1; MG/1
1 TABLET ORAL 2 TIMES DAILY
Qty: 10 TABLET | Refills: 0 | Status: SHIPPED | OUTPATIENT
Start: 2023-12-30 | End: 2024-01-04

## 2023-12-30 RX ORDER — TRIAMCINOLONE ACETONIDE 1 MG/G
CREAM TOPICAL
Qty: 45 G | Refills: 0 | Status: SHIPPED | OUTPATIENT
Start: 2023-12-30

## 2023-12-30 NOTE — PROGRESS NOTES
"Kang Lundy is a 48 y.o. male who presents for Rash (For 2 weeks on behind )      HPI  This is a new problem. Kang Lundy is a 48 y.o. patient who presents to urgent care with c/o: Rash for 2 weeks on left buttocks.  The rash has been itchy and burning.  The skin is very dry in the area.  He has been scratching at it.  He denies blistered lesions. Has never had rash like this before. Neg hx eczema.  No other aggravating or alleviating factors.       ROS See HPI    Allergies:     No Known Allergies    PMSFS Hx:  No past medical history on file.  Past Surgical History:   Procedure Laterality Date    APPENDECTOMY LAPAROSCOPIC N/A 11/30/2018    Procedure: APPENDECTOMY LAPAROSCOPIC;  Surgeon: Mercedes Shea M.D.;  Location: SURGERY [facility];  Service: General     No family history on file.  Social History     Tobacco Use    Smoking status: Never    Smokeless tobacco: Never   Substance Use Topics    Alcohol use: Yes       Problems:   Patient Active Problem List   Diagnosis    Acute appendicitis with localized peritonitis       Medications:   Current Outpatient Medications on File Prior to Visit   Medication Sig Dispense Refill    cetirizine (ZYRTEC) 10 MG Tab Take 10 mg by mouth every day.      NON SPECIFIED 1 Each by Injection route Q30 DAYS. AMINO THERAPY INJECTIONS  EVERY MONTH      bismuth subsalicylate (PEPTO-BISMOL) 262 MG Chew Tab Take 262 mg by mouth every 30 minutes as needed (UPSET STOMACH).      amoxicillin-clavulanate (AUGMENTIN) 875-125 MG Tab Take 1 Tab by mouth 2 times a day. 10 Tab 0     No current facility-administered medications on file prior to visit.        Objective:     /82 (BP Location: Left arm, Patient Position: Sitting)   Pulse 82   Temp 36.3 °C (97.3 °F) (Temporal)   Resp 14   Ht 1.727 m (5' 8\")   Wt 88 kg (194 lb)   SpO2 96%   BMI 29.50 kg/m²     Physical Exam  Vitals reviewed.   Constitutional:       Appearance: Normal appearance. He is normal weight. He " is not ill-appearing.   Cardiovascular:      Pulses: Normal pulses.   Pulmonary:      Effort: Pulmonary effort is normal.   Skin:     General: Skin is warm.      Capillary Refill: Capillary refill takes less than 2 seconds.          Neurological:      Mental Status: He is alert and oriented to person, place, and time.   Psychiatric:         Mood and Affect: Mood normal.         Behavior: Behavior normal.         Thought Content: Thought content normal.         Assessment /Associated Orders:      1. Dermatitis  sulfamethoxazole-trimethoprim (BACTRIM DS) 800-160 MG tablet      2. Skin infection  triamcinolone acetonide (KENALOG) 0.1 % Cream            Medical Decision Making:    Kang is a very pleasant 48 y.o. male who is clinically stable at today's acute urgent care visit.  No acute distress noted.  VSS. Appropriate for outpatient care at this time.   Acute problem today with uncertain prognosis.  Atypical skin rash.  Will treat for bacterial and inflammatory condition.  Patient advised that if his symptoms do not improve that he should follow-up with primary care provider and/or dermatology for further evaluation management.  There was consideration of possible shingles infection however his symptoms have been there for 2 weeks already.  Educated in proper administration of  prescription medication(s) ordered today including safety, possible SE, risks, benefits, rationale and alternatives to therapy.   Do not scratch at rash    Discussed Dx, management options (risks,benefits, and alternatives to planned treatment), natural progression and supportive care.  Expressed understanding and the treatment plan was agreed upon.   Questions were encouraged and answered   Return to urgent care prn if new or worsening sx or if there is no improvement in condition prn.    Educated in Red flags and indications to immediately call 911 or present to the Emergency Department.           Please note that this dictation was created  using voice recognition software. I have worked with consultants from the vendor as well as technical experts from [facility] to optimize the interface. I have made every reasonable attempt to correct obvious errors, but I expect that there are errors of grammar and possibly content that I did not discover before finalizing the note.  This note was electronically signed by provider

## 2025-04-14 ENCOUNTER — NON-PROVIDER VISIT (OUTPATIENT)
Dept: INTERNAL MEDICINE | Facility: OTHER | Age: 50
End: 2025-04-14
Payer: COMMERCIAL

## 2025-04-14 DIAGNOSIS — E11.65 INADEQUATELY CONTROLLED DIABETES MELLITUS (HCC): ICD-10-CM

## 2025-04-14 PROCEDURE — 97802 MEDICAL NUTRITION INDIV IN: CPT | Performed by: DIETITIAN, REGISTERED

## 2025-04-14 NOTE — PATIENT INSTRUCTIONS
Goals:  Start counting your carbohydrates. Aim for 45-60 total grams of carbs per MEAL. Add in protein and veggies to each meal.   For snacks, stick to 15 grams of carbs and have protein with it   Check your blood sugars after meals about 1-2 hours after eating, alternating. The goal is less than 180 mg/dL   [employer] weights for at home - goal is to get to 2 days per week of resistance exercise   Check out www.eatingwell.com for recipes  Go eat before you get your next set of labs

## 2025-04-14 NOTE — PROGRESS NOTES
Kang is a 49 y.o. male here for nutrition counseling/dietitian assessment for Type 2 diabetes    A1c noted to be 12.7% in January 2025 but down to 6.5% in April 2025    Currently on metformin, synjardy and jardiance?     Works stressful jobs  2 boys at home in sports - 9 and 12 years old   1-2 nights per week that are less stressful     Has cut out most sugar - sodas, eating more salads when going out to work lunches with salmon   Not snacking on carbs as he was before   Trying to eat nuts/fruits when he is snacking or popcorn (Skinny Pop)  Bringing lunch to work more often with left overs from meals over weekend     Mom has diabetes - not sure on what ttype of diabetes - passed away 2.5 years ago   She was not on insulin     When dx he had excessive thirst/urination   Lost weight when start the metformin/synjardy   He went to [facility] - plan to rule out type 1 diabetes - May 2nd seeing Elle     24 hour recall:  B/L - eggs, hash browns (little),   S - beef jerky, popcorn  D - chicken fajitas burritos     Typical day  B - banana, yogurt OR frozen egg bites (egg, cottage cheese, hill)  L - salad from home, chicken  D - [employer] chicken, rice, veggie     Exercise: treadmill running/jogging, walking dogs, 30 min 5 days per week, no resistance exercise   Has a fitbit to help him remember to move     Checking BS first thing in the morning     PES: Altered nutrition related lab values (A1c) RT newly diagnosed Type 2 vs Type 1 diabetes AEB an A1c in January at 12.7%    Since starting medications and trying to cut out most carbohydrates, Kang has seen a significant improvement in his blood sugars. However, due to his questionable family history, lack of obesity question whether or not he has Type 1 vs Type 2 diabetes. Recommend ruling out type 1 diabetes and recommended that he not go to his labs where [facility] ordered type 1 panel and hopefully cpeptide to ensure his blood sugar is high to be able to interpret his  labs.  Introduced carb counting to him and his wife to support making decisions around meal times. Recommended 45-60 grams of carbs per meal and working to check post prandial blood sugars to help see how that works for him. He is currently over restricting carbs so finding a balance will be key. Set goals; rtc with RD in 1-2 months    Time spent: 75 minutes

## 2025-04-15 VITALS — WEIGHT: 176.37 LBS | BODY MASS INDEX: 26.73 KG/M2 | HEIGHT: 68 IN

## 2025-10-22 ENCOUNTER — APPOINTMENT (OUTPATIENT)
Dept: INTERNAL MEDICINE | Facility: OTHER | Age: 50
End: 2025-10-22
Payer: COMMERCIAL

## (undated) DEVICE — SENSOR SPO2 NEO LNCS ADHESIVE (20/BX) SEE USER NOTES

## (undated) DEVICE — TROCAR Z THREAD11MM OPTICAL - NON BLADED(6/BX)

## (undated) DEVICE — CANNULA W/SEAL 5X100 Z-THRE - ADED KII (12/BX)

## (undated) DEVICE — KIT ROOM DECONTAMINATION

## (undated) DEVICE — BAG, SPONGE COUNT 50600

## (undated) DEVICE — SUCTION INSTRUMENT YANKAUER BULBOUS TIP W/O VENT (50EA/CA)

## (undated) DEVICE — TUBING INSUFFLATION - (10/BX)

## (undated) DEVICE — TUBE E-T HI-LO CUFF 7.0MM (10EA/PK)

## (undated) DEVICE — BAG RETRIEVAL 10ML (10EA/BX)

## (undated) DEVICE — DRESSING TRANSPARENT FILM TEGADERM 2.375 X 2.75"  (100EA/BX)"

## (undated) DEVICE — TROCAR Z THREAD12MM OPTICAL - NON BLADED (6/BX)

## (undated) DEVICE — STAPLE 45MM VASCULAR WHITE 2.5MM (12EA/BX)

## (undated) DEVICE — SUTURE 0 VICRYL PLUS CT-2 - 27 INCH (36/BX)

## (undated) DEVICE — NEPTUNE 4 PORT MANIFOLD - (20/PK)

## (undated) DEVICE — PROTECTOR ULNA NERVE - (36PR/CA)

## (undated) DEVICE — CANNULA W/SEAL12X100ZTHREAD - (12/BX)

## (undated) DEVICE — GLOVE BIOGEL INDICATOR SZ 7SURGICAL PF LTX - (50/BX 4BX/CA)

## (undated) DEVICE — GLOVE, LITE (PAIR)

## (undated) DEVICE — HUMID-VENT HEAT AND MOISTURE EXCHANGE- (50/BX)

## (undated) DEVICE — SUTURE GENERAL

## (undated) DEVICE — LACTATED RINGERS INJ 1000 ML - (14EA/CA 60CA/PF)

## (undated) DEVICE — MASK ANESTHESIA ADULT  - (100/CA)

## (undated) DEVICE — GOWN WARMING STANDARD FLEX - (30/CA)

## (undated) DEVICE — ELECTRODE DUAL RETURN W/ CORD - (50/PK)

## (undated) DEVICE — HEAD HOLDER JUNIOR/ADULT

## (undated) DEVICE — WATER IRRIGATION STERILE 1000ML (12EA/CA)

## (undated) DEVICE — ELECTRODE 850 FOAM ADHESIVE - HYDROGEL RADIOTRNSPRNT (50/PK)

## (undated) DEVICE — KIT ANESTHESIA W/CIRCUIT & 3/LT BAG W/FILTER (20EA/CA)

## (undated) DEVICE — SODIUM CHL IRRIGATION 0.9% 1000ML (12EA/CA)

## (undated) DEVICE — Device

## (undated) DEVICE — TUBE CONNECTING SUCTION - CLEAR PLASTIC STERILE 72 IN (50EA/CA)

## (undated) DEVICE — CANISTER SUCTION RIGID RED 1500CC (40EA/CA)

## (undated) DEVICE — SPONGE GAUZESTER. 2X2 4-PL - (2/PK 50PK/BX 30BX/CS)

## (undated) DEVICE — SET SUCTION/IRRIGATION WITH DISPOSABLE TIP (6/CA )PART #0250-070-520 IS A SUB

## (undated) DEVICE — TROCAR 5X100 NON BLADED Z-TH - READ KII (6/BX)

## (undated) DEVICE — DERMABOND ADVANCED - (12EA/BX)

## (undated) DEVICE — GLOVE BIOGEL SZ 6.5 SURGICAL PF LTX (50PR/BX 4BX/CA)

## (undated) DEVICE — SUTURE 4-0 VICRYL PLUS FS-2 - 27 INCH (36/BX)

## (undated) DEVICE — ELECTRODE 5MM LHK LAPSCP STERILE DISP- MEGADYNE  (5/CA)

## (undated) DEVICE — HANDPIECE 10FT INTPLS SCT PLS IRRIGATION HAND CONTROL SET (6/PK)